# Patient Record
Sex: MALE | Race: WHITE
[De-identification: names, ages, dates, MRNs, and addresses within clinical notes are randomized per-mention and may not be internally consistent; named-entity substitution may affect disease eponyms.]

---

## 2020-04-04 ENCOUNTER — HOSPITAL ENCOUNTER (INPATIENT)
Dept: HOSPITAL 26 - MED | Age: 55
LOS: 2 days | Discharge: HOME | DRG: 383 | End: 2020-04-06
Attending: GENERAL PRACTICE | Admitting: GENERAL PRACTICE
Payer: MEDICAID

## 2020-04-04 VITALS — HEIGHT: 70 IN | WEIGHT: 185 LBS | BODY MASS INDEX: 26.48 KG/M2

## 2020-04-04 VITALS — DIASTOLIC BLOOD PRESSURE: 75 MMHG | SYSTOLIC BLOOD PRESSURE: 121 MMHG

## 2020-04-04 VITALS — DIASTOLIC BLOOD PRESSURE: 87 MMHG | SYSTOLIC BLOOD PRESSURE: 140 MMHG
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
## 2020-04-04 DIAGNOSIS — Z23: ICD-10-CM

## 2020-04-04 DIAGNOSIS — M79.10: ICD-10-CM

## 2020-04-04 DIAGNOSIS — F15.90: ICD-10-CM

## 2020-04-04 DIAGNOSIS — I50.9: ICD-10-CM
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
## 2020-04-04 DIAGNOSIS — L03.116: ICD-10-CM

## 2020-04-04 DIAGNOSIS — S51.021A: ICD-10-CM

## 2020-04-04 DIAGNOSIS — Z71.6: ICD-10-CM

## 2020-04-04 DIAGNOSIS — R73.9: ICD-10-CM

## 2020-04-04 DIAGNOSIS — L03.113: Primary | ICD-10-CM

## 2020-04-04 DIAGNOSIS — Z56.0: ICD-10-CM

## 2020-04-04 DIAGNOSIS — E83.42: ICD-10-CM

## 2020-04-04 DIAGNOSIS — F17.200: ICD-10-CM

## 2020-04-04 DIAGNOSIS — S42.401A: ICD-10-CM
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
## 2020-04-04 DIAGNOSIS — J44.9: ICD-10-CM

## 2020-04-04 LAB
ANION GAP SERPL CALCULATED.3IONS-SCNC: 12.2 MMOL/L (ref 8–16)
BASOPHILS # BLD AUTO: 0.1 K/UL (ref 0–0.22)
BASOPHILS NFR BLD AUTO: 1.3 % (ref 0–2)
BUN SERPL-MCNC: 14 MG/DL (ref 7–18)
CHLORIDE SERPL-SCNC: 103 MMOL/L (ref 98–107)
CO2 SERPL-SCNC: 30.8 MMOL/L (ref 21–32)
CREAT SERPL-MCNC: 1.3 MG/DL (ref 0.6–1.3)
EOSINOPHIL # BLD AUTO: 0.4 K/UL (ref 0–0.4)
EOSINOPHIL NFR BLD AUTO: 3.9 % (ref 0–4)
ERYTHROCYTE [DISTWIDTH] IN BLOOD BY AUTOMATED COUNT: 14.6 % (ref 11.6–13.7)
GFR SERPL CREATININE-BSD FRML MDRD: 74 ML/MIN (ref 90–?)
GLUCOSE SERPL-MCNC: 131 MG/DL (ref 74–106)
HCT VFR BLD AUTO: 41.8 % (ref 36–52)
HGB BLD-MCNC: 13.9 G/DL (ref 12–18)
LIPASE SERPL-CCNC: 91 U/L (ref 73–393)
LYMPHOCYTES # BLD AUTO: 1.4 K/UL (ref 2–11.5)
LYMPHOCYTES NFR BLD AUTO: 14.3 % (ref 20.5–51.1)
MAGNESIUM SERPL-MCNC: 1.6 MG/DL (ref 1.8–2.4)
MCH RBC QN AUTO: 30 PG (ref 27–31)
MCHC RBC AUTO-ENTMCNC: 33 G/DL (ref 33–37)
MCV RBC AUTO: 88.9 FL (ref 80–94)
MONOCYTES # BLD AUTO: 0.8 K/UL (ref 0.8–1)
MONOCYTES NFR BLD AUTO: 8.6 % (ref 1.7–9.3)
NEUTROPHILS # BLD AUTO: 7 K/UL (ref 1.8–7.7)
NEUTROPHILS NFR BLD AUTO: 71.9 % (ref 42.2–75.2)
PHOSPHATE SERPL-MCNC: 3 MG/DL (ref 2.5–4.9)
PLATELET # BLD AUTO: 361 K/UL (ref 140–450)
POTASSIUM SERPL-SCNC: 4 MMOL/L (ref 3.5–5.1)
PROTHROMBIN TIME: 10.2 SECS (ref 10.8–13.4)
RBC # BLD AUTO: 4.7 MIL/UL (ref 4.2–6.1)
SODIUM SERPL-SCNC: 142 MMOL/L (ref 136–145)
TSH SERPL DL<=0.05 MIU/L-ACNC: 1.82 UIU/ML (ref 0.34–3.74)
WBC # BLD AUTO: 9.7 K/UL (ref 4.8–10.8)

## 2020-04-04 PROCEDURE — 3E0234Z INTRODUCTION OF SERUM, TOXOID AND VACCINE INTO MUSCLE, PERCUTANEOUS APPROACH: ICD-10-PCS

## 2020-04-04 RX ADMIN — SODIUM CHLORIDE SCH MLS/HR: 9 INJECTION, SOLUTION INTRAVENOUS at 21:47

## 2020-04-04 SDOH — ECONOMIC STABILITY - INCOME SECURITY: UNEMPLOYMENT, UNSPECIFIED: Z56.0

## 2020-04-04 NOTE — NUR
PATIENT ARRIVED FROM ED VIA WHEELCHAIR. RECEIVED REPORT FROM ER NURSE. PATIENT AWAKE, ALERT, 
AOX4. AMBULATORY. RESPIRATIONS EVEN AND UNLABORED. SKIN IS WARM AND DRY. WITH OPEN WOUND ON 
R ELBOW. DRESSING CLEAN, DRY, AND INTACT. WITH IV G 20 ON LEFT FA. VITAL SIGNS WERE STABLE. 
DENIES ANY PAIN OR DISCOMFORT AT THIS TIME. PATIENT ORIENTED TO ROOM. MRSA NARES SWAB TAKEN. 
KEPT COMFORTABLE. WILL CONTINUE TO MONITOR.
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
## 2020-04-04 NOTE — NUR
Patient will be admitted to care of DR SUH. Admited to MED SURG.  Will go to 
room 119A. Belongings list completed.  Report to JUSTIN ADAMS.

## 2020-04-05 VITALS — DIASTOLIC BLOOD PRESSURE: 75 MMHG | SYSTOLIC BLOOD PRESSURE: 110 MMHG

## 2020-04-05 VITALS — DIASTOLIC BLOOD PRESSURE: 52 MMHG | SYSTOLIC BLOOD PRESSURE: 125 MMHG

## 2020-04-05 VITALS — SYSTOLIC BLOOD PRESSURE: 123 MMHG | DIASTOLIC BLOOD PRESSURE: 76 MMHG

## 2020-04-05 LAB
ANION GAP SERPL CALCULATED.3IONS-SCNC: 10.2 MMOL/L (ref 8–16)
APPEARANCE UR: (no result)
BARBITURATES UR QL SCN: (no result) NG/ML
BASOPHILS # BLD AUTO: 0.1 K/UL (ref 0–0.22)
BASOPHILS NFR BLD AUTO: 1.2 % (ref 0–2)
BENZODIAZ UR QL SCN: (no result) NG/ML
BILIRUB UR QL STRIP: NEGATIVE
BUN SERPL-MCNC: 13 MG/DL (ref 7–18)
BZE UR QL SCN: (no result) NG/ML
CANNABINOIDS UR QL SCN: (no result) NG/ML
CHLORIDE SERPL-SCNC: 104 MMOL/L (ref 98–107)
CHOLEST/HDLC SERPL: 2 {RATIO} (ref 1–4.5)
CO2 SERPL-SCNC: 31.9 MMOL/L (ref 21–32)
COLOR UR: YELLOW
CREAT SERPL-MCNC: 1.2 MG/DL (ref 0.6–1.3)
EOSINOPHIL # BLD AUTO: 0.6 K/UL (ref 0–0.4)
EOSINOPHIL NFR BLD AUTO: 6.9 % (ref 0–4)
ERYTHROCYTE [DISTWIDTH] IN BLOOD BY AUTOMATED COUNT: 14.6 % (ref 11.6–13.7)
GFR SERPL CREATININE-BSD FRML MDRD: 81 ML/MIN (ref 90–?)
GLUCOSE SERPL-MCNC: 105 MG/DL (ref 74–106)
GLUCOSE UR STRIP-MCNC: NEGATIVE MG/DL
HCT VFR BLD AUTO: 39.9 % (ref 36–52)
HDLC SERPL-MCNC: 51 MG/DL (ref 40–60)
HGB BLD-MCNC: 13.4 G/DL (ref 12–18)
HGB UR QL STRIP: NEGATIVE
LDLC SERPL CALC-MCNC: 46 MG/DL (ref 60–100)
LEUKOCYTE ESTERASE UR QL STRIP: NEGATIVE
LYMPHOCYTES # BLD AUTO: 1.7 K/UL (ref 2–11.5)
LYMPHOCYTES NFR BLD AUTO: 20.1 % (ref 20.5–51.1)
MCH RBC QN AUTO: 30 PG (ref 27–31)
MCHC RBC AUTO-ENTMCNC: 34 G/DL (ref 33–37)
MCV RBC AUTO: 88.7 FL (ref 80–94)
MONOCYTES # BLD AUTO: 0.9 K/UL (ref 0.8–1)
MONOCYTES NFR BLD AUTO: 10.7 % (ref 1.7–9.3)
NEUTROPHILS # BLD AUTO: 5.3 K/UL (ref 1.8–7.7)
NEUTROPHILS NFR BLD AUTO: 61.1 % (ref 42.2–75.2)
NITRITE UR QL STRIP: NEGATIVE
OPIATES UR QL SCN: (no result) NG/ML
PCP UR QL SCN: (no result) NG/ML
PH UR STRIP: 7 [PH] (ref 5–9)
PLATELET # BLD AUTO: 313 K/UL (ref 140–450)
POTASSIUM SERPL-SCNC: 4.1 MMOL/L (ref 3.5–5.1)
RBC # BLD AUTO: 4.5 MIL/UL (ref 4.2–6.1)
RBC #/AREA URNS HPF: (no result) /HPF (ref 0–5)
SODIUM SERPL-SCNC: 142 MMOL/L (ref 136–145)
TRIGL SERPL-MCNC: 38 MG/DL (ref 30–150)
WBC # BLD AUTO: 8.6 K/UL (ref 4.8–10.8)
WBC,URINE: 0 /HPF (ref 0–5)

## 2020-04-05 RX ADMIN — SODIUM CHLORIDE SCH MLS/HR: 9 INJECTION, SOLUTION INTRAVENOUS at 12:55

## 2020-04-05 RX ADMIN — SODIUM CHLORIDE SCH MLS/HR: 9 INJECTION, SOLUTION INTRAVENOUS at 17:23

## 2020-04-05 RX ADMIN — SODIUM CHLORIDE SCH MLS/HR: 9 INJECTION, SOLUTION INTRAVENOUS at 00:01

## 2020-04-05 RX ADMIN — SODIUM CHLORIDE SCH MLS/HR: 9 INJECTION, SOLUTION INTRAVENOUS at 23:49

## 2020-04-05 RX ADMIN — SODIUM CHLORIDE SCH MLS/HR: 9 INJECTION, SOLUTION INTRAVENOUS at 17:14

## 2020-04-05 RX ADMIN — SODIUM CHLORIDE SCH MLS/HR: 9 INJECTION, SOLUTION INTRAVENOUS at 11:21

## 2020-04-05 RX ADMIN — SODIUM CHLORIDE SCH MLS/HR: 9 INJECTION, SOLUTION INTRAVENOUS at 05:27

## 2020-04-05 NOTE — NUR
PATIENT IN BED SLEEPING. IVF INFUSING WELL. RESPIRATIONS EVEN AND UNLABORED. NO SIGNS OF 
PAIN OR DISCOMFORT. KEPT COMFORTABLE. CALL LIGHT WITHIN REACH. WILL CONTINUE TO MONITOR.

## 2020-04-05 NOTE — NUR
ROUNDS DONE. PATIENT IN BED SLEEPING. NO SIGNS OF PAIN OR DISCOMFORT NOTED. IVF INFUSING 
WELL. CALL LIGHT WITHIN REACH. WILL CONTINUE TO MONITOR.

## 2020-04-05 NOTE — NUR
PATIENT IN BED SLEEPING. RESPIRATIONS EVEN AND UNLABORED. NO S/SX OF DISTRESS NOTED. 
SCHEDULED MEDICATION GIVEN AS ORDERED. SAFETY MEASURES IN PLACE. WILL CONTINUE TO MONITOR.

## 2020-04-05 NOTE — NUR
PATIENT IN BED SLEEPING. RESPIRATIONS EVEN AND UNLABORED. NO SIGNS AND SYMPTOMS OF DISTRESS 
NOTED. IVF INFUSING WELL. SAFETY MEASURES IN PLACE. CALL LIGHT WITHIN REACH. WILL CONTINUE 
TO MONITOR.

## 2020-04-05 NOTE — NUR
RECEIVED REPORT FROM DAY SHIFT NURSE. PATIENT IN BED AOX4. DENIES ANY PAIN OR DISTRESS. 
RESPIRATIONS EVEN AND UNLABORED. IV INTACT AND INFUSING WELL. DRESSING CLEAN AND INTACT. 
PLAN OF CARE DISCUSSED. PATIENT KEPT COMFORTABLE. WILL CONTINUE TO MONITOR.

## 2020-04-05 NOTE — NUR
PATIENT COMPLAINED OF R UPPER EXTREMITY PAIN 6/10. PAIN MEDICATIONS GIVEN AS ORDERED. 
SCHEDULED MEDS GIVEN AS WELL. IVF INFUSING WELL. NO OTHER REQUESTS MADE. WILL CONTINUE TO 
MONITOR.

## 2020-04-05 NOTE — NUR
PATIENT IN BED RESTING. VITAL SIGNS STABLE. DENIES PAIN AT THIS TIME. SCHEDULED MEDICATIONS 
GIVEN. RESPIRATIONS EVEN AND UNLABORED. IVF INFUSING WELL. PATIENT KEPT COMFORTABLE. SAFETY 
MEASURES IN PLACE. WILL CONTINUE TO MONITOR.

## 2020-04-05 NOTE — NUR
PATIENT IN BED RESTING. DENIES ANY PAIN OR DISCOMFORT. IVF INFUSING WELL. SAFETY MEASURES IN 
PLACE. WILL CONTINUE TO MONITOR.

## 2020-04-05 NOTE — NUR
PATIENT HAS BEEN SCREENED AND CATEGORIZED AS MODERATE NUTRITION RISK. PATIENT WILL BE SEEN 
WITHIN 3-5 DAYS OF ADMISSION.



04/08/20-04/10/20



SAHIL PARKER MS, RDN

## 2020-04-05 NOTE — NUR
RECEIVED REPORT FROM NIGHT SHIFT NURSE. AOX3, NO C/O PAIN, , NO SOB. WITH IV SITE 20G ON 
LFA, INTACT AND INFUSING WELL NS AT 60MLS/HR. RIGHT ELBOW DRESSING INTACT UNIVERSAL 
PRECAUTIONS IN PLACE. PT IN STABLE CONDITION

## 2020-04-06 VITALS — DIASTOLIC BLOOD PRESSURE: 84 MMHG | SYSTOLIC BLOOD PRESSURE: 127 MMHG

## 2020-04-06 VITALS — DIASTOLIC BLOOD PRESSURE: 63 MMHG | SYSTOLIC BLOOD PRESSURE: 108 MMHG

## 2020-04-06 LAB
ANION GAP SERPL CALCULATED.3IONS-SCNC: 10.2 MMOL/L (ref 8–16)
BASOPHILS # BLD AUTO: 0.1 K/UL (ref 0–0.22)
BASOPHILS NFR BLD AUTO: 1.8 % (ref 0–2)
BUN SERPL-MCNC: 16 MG/DL (ref 7–18)
CHLORIDE SERPL-SCNC: 105 MMOL/L (ref 98–107)
CO2 SERPL-SCNC: 28.8 MMOL/L (ref 21–32)
CREAT SERPL-MCNC: 1.1 MG/DL (ref 0.6–1.3)
EOSINOPHIL # BLD AUTO: 0.6 K/UL (ref 0–0.4)
EOSINOPHIL NFR BLD AUTO: 8.5 % (ref 0–4)
ERYTHROCYTE [DISTWIDTH] IN BLOOD BY AUTOMATED COUNT: 14.1 % (ref 11.6–13.7)
GFR SERPL CREATININE-BSD FRML MDRD: 90 ML/MIN (ref 90–?)
GLUCOSE SERPL-MCNC: 95 MG/DL (ref 74–106)
HCT VFR BLD AUTO: 39.6 % (ref 36–52)
HGB BLD-MCNC: 13.2 G/DL (ref 12–18)
LYMPHOCYTES # BLD AUTO: 1.9 K/UL (ref 2–11.5)
LYMPHOCYTES NFR BLD AUTO: 28.3 % (ref 20.5–51.1)
MAGNESIUM SERPL-MCNC: 1.7 MG/DL (ref 1.8–2.4)
MCH RBC QN AUTO: 30 PG (ref 27–31)
MCHC RBC AUTO-ENTMCNC: 33 G/DL (ref 33–37)
MCV RBC AUTO: 88.7 FL (ref 80–94)
MONOCYTES # BLD AUTO: 0.6 K/UL (ref 0.8–1)
MONOCYTES NFR BLD AUTO: 9.5 % (ref 1.7–9.3)
NEUTROPHILS # BLD AUTO: 3.5 K/UL (ref 1.8–7.7)
NEUTROPHILS NFR BLD AUTO: 51.9 % (ref 42.2–75.2)
PHOSPHATE SERPL-MCNC: 3.4 MG/DL (ref 2.5–4.9)
PLATELET # BLD AUTO: 316 K/UL (ref 140–450)
POTASSIUM SERPL-SCNC: 4 MMOL/L (ref 3.5–5.1)
RBC # BLD AUTO: 4.46 MIL/UL (ref 4.2–6.1)
SODIUM SERPL-SCNC: 140 MMOL/L (ref 136–145)
WBC # BLD AUTO: 6.7 K/UL (ref 4.8–10.8)

## 2020-04-06 RX ADMIN — SODIUM CHLORIDE SCH MLS/HR: 9 INJECTION, SOLUTION INTRAVENOUS at 05:05

## 2020-04-06 NOTE — NUR
Note:



Basic Screen: 

Yes

High Risk DC Screen 

No

 Name: 

N/A

Pre-Admission Living Arrangements: 

Other

Other: 

HOMELESS BY CHOICE

Prior ADL 

 Independent

Current Home Health Name/Tel: 

N/A

Current DME/02 Name/Tel: 

N/A

Current Hospice Name/Tel: 

N/A

Current Dialysis Name/Tel: 

N/A

Healthcare Decision Maker: 

Patient

Advance Directive 

No

Physician Orders for Life Sustaining Treatment Form 

No

Patient/Family Have Educational Needs 

No

Information Taught: 

 Advance Directive

Community Resources

Person Taught: 

Patient

Teaching Tools: 

Verbal

Factors Affecting Learning: 

None

Participation Level: 

Refused

Evaluation: 

Verbalizes Understanding

Needs Additional Education: 

No

Discipline: 

Case Mgt/Social Svcs

Tentative Discharge Plan/Destination: 

No Needs Identified

Will require assistance post discharge: 

No

 Referred to : 

No

Tentative Discharge Plan Summary: 

Patient is a 54-year-old mael admitted for right UE cellulitis. Patient 

has PMHX of COPD. Patient was admitted from ER. SW met with patient at 

bedside to verify demographics. Patient reported that he has been homeless 

for 10 years. Patient stated that it is by choice. SW offered homeless and 

room and board resources, but patient refused. Patient reports no history 

of mental health. Patient also reports a history of methamphetamine use. 

Patient refused substance abuse resources. Patient stated that his plan 

after discharge is to return to [place], where he lives in a [place]. SW 

stated that weather appropriate clothing and a meal to go is available to 

him at discharge. No further needs identified.

 Signature: 

TANIKA Dennis

Date: 

Apr 6, 2020

Time: 

11:48

## 2020-04-06 NOTE — NUR
DISCHARGE INSTRUCTIONS AND PRESCRIPTION GIVEN TO PATIENT. PT VERBALIZED UNDERSTANDING. NO 
C/O PAIN, NO SOB, AFEBRILE. ID BAND REMOVED, IV REMOVED WITH LUMEN INTACT. PT'S DAUGHTER 
WILL PICK HIM UP

## 2020-04-06 NOTE — NUR
RIGHT ELBOW WOUND WITH FULL THICKNESS SKIN LOSS, 2X1.5X0.2CM, WOUND BED YELLOW, WITH SMALL 
AMOUNT SEROSANGUINEOUS DRAINAGE, NO ODOR, KADE WOUND SKIN INTACT ERYTHEMA AND NO INDURATION, 
NO PAIN. POC DISCUSSED WITH PT., DR. ZAVALA, AND PRIMARY RN.

RECOMMENDATION: CLEANSE RIGHT ELBOW WOUND WITH NS. PAT DEY APPLY SILVASORB GEL AND COVER 
WITH DRY DRESSING SECURED WITH TAPE QD AND PRN IF SOILING. MAY DISCHARGE WITH WOUND CARE 
SUPPLIES.

## 2020-04-06 NOTE — NUR
RECEIVED REPORT FROM NIGHT SHIFT NURSE. AOX3, NO C/O PAIN, , NO SOB. WITH IV SITE 20G ON 
LFA, INTACT AND INFUSING WELL NS AT 60MLS/HR. RIGHT ELBOW DRESSING INTACT. UNIVERSAL 
PRECAUTIONS IN PLACE. PT IN STABLE CONDITION.

## 2020-04-06 NOTE — NUR
PATIENT IN BED RESTING. SCHEDULED MEDICATIONS GIVEN. NO COMPLAINTS MADE AT THIS TIME. IVF 
INFUSING WELL. SAFETY MEASURES IN PLACE. WILL CONTINUE TO MONITOR.

## 2020-04-06 NOTE — NUR
ROUNDS DONE. PATIENT IN BED SLEEPING. NO SIGNS AND SYMPTOMS OF PAIN OR DISTRESS NOTED. 
RESPIRATIONS EVEN AND UNLABORED. IVF INFUSING WELL. SAFETY MEASURES IN PLACE. WILL CONTINUE 
TO MONITOR.